# Patient Record
Sex: FEMALE | Race: WHITE | NOT HISPANIC OR LATINO | RURAL
[De-identification: names, ages, dates, MRNs, and addresses within clinical notes are randomized per-mention and may not be internally consistent; named-entity substitution may affect disease eponyms.]

---

## 2017-09-07 ENCOUNTER — OFFICE VISIT (OUTPATIENT)
Dept: ENDOCRINOLOGY | Facility: CLINIC | Age: 72
End: 2017-09-07

## 2017-09-07 VITALS
BODY MASS INDEX: 26.06 KG/M2 | WEIGHT: 138 LBS | DIASTOLIC BLOOD PRESSURE: 72 MMHG | SYSTOLIC BLOOD PRESSURE: 122 MMHG | HEIGHT: 61 IN | HEART RATE: 83 BPM

## 2017-09-07 DIAGNOSIS — M81.0 OSTEOPOROSIS: ICD-10-CM

## 2017-09-07 DIAGNOSIS — I10 ESSENTIAL HYPERTENSION: ICD-10-CM

## 2017-09-07 DIAGNOSIS — E55.9 VITAMIN D DEFICIENCY: ICD-10-CM

## 2017-09-07 DIAGNOSIS — E06.3 HASHIMOTO'S THYROIDITIS: Primary | ICD-10-CM

## 2017-09-07 DIAGNOSIS — R73.01 IMPAIRED FASTING GLUCOSE: ICD-10-CM

## 2017-09-07 PROCEDURE — 85025 COMPLETE CBC W/AUTO DIFF WBC: CPT | Performed by: INTERNAL MEDICINE

## 2017-09-07 PROCEDURE — 80053 COMPREHEN METABOLIC PANEL: CPT | Performed by: INTERNAL MEDICINE

## 2017-09-07 PROCEDURE — 99214 OFFICE O/P EST MOD 30 MIN: CPT | Performed by: INTERNAL MEDICINE

## 2017-09-07 PROCEDURE — 83036 HEMOGLOBIN GLYCOSYLATED A1C: CPT | Performed by: INTERNAL MEDICINE

## 2017-09-07 PROCEDURE — 82607 VITAMIN B-12: CPT | Performed by: INTERNAL MEDICINE

## 2017-09-07 PROCEDURE — 84443 ASSAY THYROID STIM HORMONE: CPT | Performed by: INTERNAL MEDICINE

## 2017-09-07 PROCEDURE — 82306 VITAMIN D 25 HYDROXY: CPT | Performed by: INTERNAL MEDICINE

## 2017-09-07 PROCEDURE — 36415 COLL VENOUS BLD VENIPUNCTURE: CPT | Performed by: FAMILY MEDICINE

## 2017-09-07 PROCEDURE — 80061 LIPID PANEL: CPT | Performed by: INTERNAL MEDICINE

## 2017-09-07 RX ORDER — LISINOPRIL AND HYDROCHLOROTHIAZIDE 20; 12.5 MG/1; MG/1
1 TABLET ORAL DAILY
Qty: 90 TABLET | Refills: 3 | Status: SHIPPED | OUTPATIENT
Start: 2017-09-07

## 2017-09-07 RX ORDER — ERGOCALCIFEROL 1.25 MG/1
50000 CAPSULE ORAL
Qty: 6 CAPSULE | Refills: 3 | Status: SHIPPED | OUTPATIENT
Start: 2017-09-07 | End: 2017-09-07 | Stop reason: SDUPTHER

## 2017-09-07 RX ORDER — LEVOTHYROXINE AND LIOTHYRONINE 19; 4.5 UG/1; UG/1
30 TABLET ORAL DAILY
Qty: 90 TABLET | Refills: 3 | Status: SHIPPED | OUTPATIENT
Start: 2017-09-07

## 2017-09-07 RX ORDER — NICOTINE POLACRILEX 4 MG/1
1 GUM, CHEWING ORAL 2 TIMES DAILY
Qty: 180 EACH | Refills: 3 | Status: SHIPPED | OUTPATIENT
Start: 2017-09-07 | End: 2018-09-27 | Stop reason: SDUPTHER

## 2017-09-07 RX ORDER — AMLODIPINE BESYLATE 2.5 MG/1
2.5 TABLET ORAL DAILY
Qty: 90 TABLET | Refills: 3 | Status: SHIPPED | OUTPATIENT
Start: 2017-09-07 | End: 2018-09-07

## 2017-09-07 RX ORDER — ERGOCALCIFEROL 1.25 MG/1
50000 CAPSULE ORAL
Qty: 6 CAPSULE | Refills: 3 | Status: SHIPPED | OUTPATIENT
Start: 2017-09-07

## 2017-09-07 RX ORDER — ERGOCALCIFEROL 1.25 MG/1
50000 CAPSULE ORAL
Qty: 2 CAPSULE | Refills: 11 | Status: SHIPPED | OUTPATIENT
Start: 2017-09-07 | End: 2017-09-07 | Stop reason: SDUPTHER

## 2017-09-07 NOTE — PROGRESS NOTES
Suzy Linder is a 72 y.o. female  Comes for hypothryoidism    Referred bySammy العراقي MD      70 yo  comes for consultation    reason - hypothyroidism    comorbidities - Hep C aquired through transfusions    duration - since 1967    symptoms - fatigue , wake up tired , cold intolerance     alleviating factors -  On armour thyroid and better.  On synthroid felt weak     severity - moderate    timing - constant    records from Dr. العراقي from 5-14 obtained and reviewed    --    I also follow her for osteoporosis    was on calcitionin, prolia not approved  I gave her reclast and developed side effects , see AP     --     Now told to have prediabetes       Past Medical History:   Diagnosis Date   • Acute sinusitis    • Chronic hepatitis C    • Hashimoto's thyroiditis    • Hypertension    • Need for immunization against influenza    • Osteoporosis    • Posterior rhinorrhea    • Vitamin D deficiency      Family History   Problem Relation Age of Onset   • Other Other      THYROID DISORDER     Social History   Substance Use Topics   • Smoking status: Never Smoker   • Smokeless tobacco: Never Used   • Alcohol use None       Review of Systems    Review of Systems   Constitutional: Negative for activity change, appetite change, chills, diaphoresis, fatigue, fever and unexpected weight change.   HENT: Negative for congestion, drooling, ear discharge, ear pain, facial swelling, mouth sores, nosebleeds, postnasal drip, sinus pressure, sneezing, sore throat, tinnitus, trouble swallowing and voice change.    Eyes: Negative.  Negative for photophobia, pain, discharge, redness and itching.   Respiratory: Negative.  Negative for apnea, cough, choking, chest tightness, shortness of breath, wheezing and stridor.    Cardiovascular: Negative.  Negative for chest pain, palpitations and leg swelling.   Gastrointestinal: Negative.  Negative for abdominal distention, abdominal pain, constipation, diarrhea, nausea and vomiting.  "  Endocrine: Negative.  Negative for cold intolerance, heat intolerance, polydipsia, polyphagia and polyuria.   Genitourinary: Negative for difficulty urinating, dysuria, flank pain and frequency.   Musculoskeletal: Negative for arthralgias, back pain, gait problem, joint swelling, myalgias, neck pain and neck stiffness.   Skin: Negative for color change, pallor, rash and wound.   Allergic/Immunologic: Negative for environmental allergies, food allergies and immunocompromised state.   Neurological: Negative for dizziness, tremors, seizures, syncope, facial asymmetry, speech difficulty, weakness, light-headedness, numbness and headaches.   Hematological: Negative for adenopathy. Does not bruise/bleed easily.   Psychiatric/Behavioral: Negative for agitation, behavioral problems, confusion, decreased concentration, dysphoric mood, hallucinations, self-injury, sleep disturbance and suicidal ideas. The patient is not nervous/anxious and is not hyperactive.         Objective:     /72 (BP Location: Right arm, Patient Position: Sitting, Cuff Size: Adult)  Pulse 83  Ht 61\" (154.9 cm)  Wt 138 lb (62.6 kg)  BMI 26.07 kg/m2    Physical Exam   Constitutional: She is oriented to person, place, and time. She appears well-developed.   HENT:   Head: Normocephalic.   Right Ear: External ear normal.   Left Ear: External ear normal.   Nose: Nose normal.   Eyes: Conjunctivae and EOM are normal. No scleral icterus.   Neck: Normal range of motion. Neck supple. No tracheal deviation present. No thyromegaly present.   Cardiovascular: Normal rate, regular rhythm, normal heart sounds and intact distal pulses.  Exam reveals no gallop and no friction rub.    No murmur heard.  Pulmonary/Chest: Effort normal and breath sounds normal. No stridor. No respiratory distress. She has no wheezes. She has no rales. She exhibits no tenderness.   Abdominal: Soft. Bowel sounds are normal. She exhibits no distension and no mass. There is no " tenderness. There is no rebound and no guarding.   Musculoskeletal: Normal range of motion. She exhibits no tenderness or deformity.   Lymphadenopathy:     She has no cervical adenopathy.   Neurological: She is alert and oriented to person, place, and time. She displays normal reflexes. She exhibits normal muscle tone. Coordination normal.   Skin: No rash noted. No erythema. No pallor.   Psychiatric: She has a normal mood and affect. Her behavior is normal. Judgment and thought content normal.       Lab Review    Lab Results   Component Value Date    HGBA1C 5.8 (H) 09/07/2017       Results for orders placed or performed in visit on 09/07/17   CBC Auto Differential   Result Value Ref Range    WBC 7.74 3.20 - 9.80 10*3/mm3    RBC 5.44 (H) 3.77 - 5.16 10*6/mm3    Hemoglobin 14.5 12.0 - 15.5 g/dL    Hematocrit 44.3 35.0 - 45.0 %    MCV 81.4 80.0 - 98.0 fL    MCH 26.7 26.5 - 34.0 pg    MCHC 32.7 31.4 - 36.0 g/dL    RDW 13.0 11.5 - 14.5 %    RDW-SD 38.8 36.4 - 46.3 fl    MPV 11.8 8.0 - 12.0 fL    Platelets 217 150 - 450 10*3/mm3    Neutrophil % 58.3 37.0 - 80.0 %    Lymphocyte % 31.4 10.0 - 50.0 %    Monocyte % 8.3 0.0 - 12.0 %    Eosinophil % 1.6 0.0 - 7.0 %    Basophil % 0.3 0.0 - 2.0 %    Immature Grans % 0.1 0.0 - 0.5 %    Neutrophils, Absolute 4.52 2.00 - 8.60 10*3/mm3    Lymphocytes, Absolute 2.43 0.60 - 4.20 10*3/mm3    Monocytes, Absolute 0.64 0.00 - 0.90 10*3/mm3    Eosinophils, Absolute 0.12 0.00 - 0.70 10*3/mm3    Basophils, Absolute 0.02 0.00 - 0.20 10*3/mm3    Immature Grans, Absolute 0.01 0.00 - 0.02 10*3/mm3   Comprehensive Metabolic Panel   Result Value Ref Range    Glucose 88 60 - 100 mg/dL    BUN 17 7 - 21 mg/dL    Creatinine 0.87 0.50 - 1.00 mg/dL    Sodium 138 137 - 145 mmol/L    Potassium 4.8 3.5 - 5.1 mmol/L    Chloride 100 95 - 110 mmol/L    CO2 29.0 22.0 - 31.0 mmol/L    Calcium 9.5 8.4 - 10.2 mg/dL    Total Protein 7.7 6.3 - 8.6 g/dL    Albumin 4.60 3.40 - 4.80 g/dL    ALT (SGPT) 27 9 - 52 U/L     AST (SGOT) 17 14 - 36 U/L    Alkaline Phosphatase 62 38 - 126 U/L    Total Bilirubin 1.0 0.2 - 1.3 mg/dL    eGFR Non African Amer 64 39 - 90 mL/min/1.73    Globulin 3.1 2.3 - 3.5 gm/dL    A/G Ratio 1.5 1.1 - 1.8 g/dL    BUN/Creatinine Ratio 19.5 7.0 - 25.0    Anion Gap 9.0 5.0 - 15.0 mmol/L   Hemoglobin A1c   Result Value Ref Range    Hemoglobin A1C 5.8 (H) 4 - 5.6 %   Lipid Panel   Result Value Ref Range    Total Cholesterol 192 0 - 199 mg/dL    Triglycerides 96 20 - 199 mg/dL    HDL Cholesterol 50 (L) 60 - 200 mg/dL    LDL Cholesterol  134 (H) 1 - 129 mg/dL    LDL/HDL Ratio 2.46 0.00 - 3.22   TSH   Result Value Ref Range    TSH 0.440 (L) 0.460 - 4.680 mIU/mL   Vitamin B12   Result Value Ref Range    Vitamin B-12 494 239 - 931 pg/mL   Vitamin D 25 Hydroxy   Result Value Ref Range    25 Hydroxy, Vitamin D 35.0 30.0 - 100.0 ng/ml       Lab Results   Component Value Date    TSH 0.440 (L) 09/07/2017         Assessment/Plan       ICD-10-CM ICD-9-CM   1. Hashimoto's thyroiditis E06.3 245.2   2. Osteoporosis M81.0 733.00   3. Vitamin D deficiency E55.9 268.9   4. Essential hypertension I10 401.9   5. Impaired fasting glucose R73.01 790.21           Hypothyroidism    Failed levothyroxine    Now on  armour thryoid 30 mg daily     Lab Results   Component Value Date    TSH 0.440 (L) 09/07/2017    TSH 0.47 08/15/2016    TSH 0.43 (L) 02/29/2016       Lab Results   Component Value Date    FREET4 0.83 08/15/2016    FREET4 1.04 04/22/2015       Lab Results   Component Value Date    X2RCZUX 176 (H) 08/15/2016       Mild suppression, ok to keep    =====================    Vit D Def- osteoporosis    June 2015 - Scan shows  L1-L4 (BMD)- 0.874 , T score is -1.6   Right femoral neck - 0.589  T score is -2.3.   Left femoral neck- 0.638 g. T score is -1.9     Off calcium and vitamin D     has been on calcitonin in the past     reclast side effects    prolia not covered?  Insurance has to approve    Aim for 1000 mg of calcium    Was on vit  D 50 th u weekly   Restart at every 2 weeks       Lab Results   Component Value Date    PKUT86ZE 35.0 09/07/2017    CTXO66TQ 52.8 10/04/2016    TGQW04BH 61.9 08/15/2016           Prediabetes    Lab Results   Component Value Date    HGBA1C 5.8 (H) 09/07/2017    HGBA1C 5.3 11/03/2015     Lab Results   Component Value Date    LDLCALC 119 02/29/2016    CREATININE 0.87 09/07/2017       Observe, no metformin yet      bp controlled on amlodipine and lisinopril , hctz          MDM

## 2017-09-08 LAB
25(OH)D3 SERPL-MCNC: 35 NG/ML (ref 30–100)
ALBUMIN SERPL-MCNC: 4.6 G/DL (ref 3.4–4.8)
ALBUMIN/GLOB SERPL: 1.5 G/DL (ref 1.1–1.8)
ALP SERPL-CCNC: 62 U/L (ref 38–126)
ALT SERPL W P-5'-P-CCNC: 27 U/L (ref 9–52)
ANION GAP SERPL CALCULATED.3IONS-SCNC: 9 MMOL/L (ref 5–15)
ARTICHOKE IGE QN: 134 MG/DL (ref 1–129)
AST SERPL-CCNC: 17 U/L (ref 14–36)
BASOPHILS # BLD AUTO: 0.02 10*3/MM3 (ref 0–0.2)
BASOPHILS NFR BLD AUTO: 0.3 % (ref 0–2)
BILIRUB SERPL-MCNC: 1 MG/DL (ref 0.2–1.3)
BUN BLD-MCNC: 17 MG/DL (ref 7–21)
BUN/CREAT SERPL: 19.5 (ref 7–25)
CALCIUM SPEC-SCNC: 9.5 MG/DL (ref 8.4–10.2)
CHLORIDE SERPL-SCNC: 100 MMOL/L (ref 95–110)
CHOLEST SERPL-MCNC: 192 MG/DL (ref 0–199)
CO2 SERPL-SCNC: 29 MMOL/L (ref 22–31)
CREAT BLD-MCNC: 0.87 MG/DL (ref 0.5–1)
DEPRECATED RDW RBC AUTO: 38.8 FL (ref 36.4–46.3)
EOSINOPHIL # BLD AUTO: 0.12 10*3/MM3 (ref 0–0.7)
EOSINOPHIL NFR BLD AUTO: 1.6 % (ref 0–7)
ERYTHROCYTE [DISTWIDTH] IN BLOOD BY AUTOMATED COUNT: 13 % (ref 11.5–14.5)
GFR SERPL CREATININE-BSD FRML MDRD: 64 ML/MIN/1.73 (ref 39–90)
GLOBULIN UR ELPH-MCNC: 3.1 GM/DL (ref 2.3–3.5)
GLUCOSE BLD-MCNC: 88 MG/DL (ref 60–100)
HBA1C MFR BLD: 5.8 % (ref 4–5.6)
HCT VFR BLD AUTO: 44.3 % (ref 35–45)
HDLC SERPL-MCNC: 50 MG/DL (ref 60–200)
HGB BLD-MCNC: 14.5 G/DL (ref 12–15.5)
IMM GRANULOCYTES # BLD: 0.01 10*3/MM3 (ref 0–0.02)
IMM GRANULOCYTES NFR BLD: 0.1 % (ref 0–0.5)
LDLC/HDLC SERPL: 2.46 {RATIO} (ref 0–3.22)
LYMPHOCYTES # BLD AUTO: 2.43 10*3/MM3 (ref 0.6–4.2)
LYMPHOCYTES NFR BLD AUTO: 31.4 % (ref 10–50)
MCH RBC QN AUTO: 26.7 PG (ref 26.5–34)
MCHC RBC AUTO-ENTMCNC: 32.7 G/DL (ref 31.4–36)
MCV RBC AUTO: 81.4 FL (ref 80–98)
MONOCYTES # BLD AUTO: 0.64 10*3/MM3 (ref 0–0.9)
MONOCYTES NFR BLD AUTO: 8.3 % (ref 0–12)
NEUTROPHILS # BLD AUTO: 4.52 10*3/MM3 (ref 2–8.6)
NEUTROPHILS NFR BLD AUTO: 58.3 % (ref 37–80)
PLATELET # BLD AUTO: 217 10*3/MM3 (ref 150–450)
PMV BLD AUTO: 11.8 FL (ref 8–12)
POTASSIUM BLD-SCNC: 4.8 MMOL/L (ref 3.5–5.1)
PROT SERPL-MCNC: 7.7 G/DL (ref 6.3–8.6)
RBC # BLD AUTO: 5.44 10*6/MM3 (ref 3.77–5.16)
SODIUM BLD-SCNC: 138 MMOL/L (ref 137–145)
TRIGL SERPL-MCNC: 96 MG/DL (ref 20–199)
TSH SERPL DL<=0.05 MIU/L-ACNC: 0.44 MIU/ML (ref 0.46–4.68)
VIT B12 BLD-MCNC: 494 PG/ML (ref 239–931)
WBC NRBC COR # BLD: 7.74 10*3/MM3 (ref 3.2–9.8)

## 2017-09-09 PROBLEM — R73.01 IMPAIRED FASTING GLUCOSE: Status: ACTIVE | Noted: 2017-09-09

## 2017-09-09 RX ORDER — PRAVASTATIN SODIUM 20 MG
20 TABLET ORAL EVERY EVENING
Qty: 30 TABLET | Refills: 11 | Status: SHIPPED | OUTPATIENT
Start: 2017-09-09 | End: 2018-09-09

## 2018-09-27 RX ORDER — OMEPRAZOLE 20 MG/1
CAPSULE, DELAYED RELEASE ORAL
Qty: 180 CAPSULE | Refills: 3 | Status: SHIPPED | OUTPATIENT
Start: 2018-09-27

## 2020-09-10 ENCOUNTER — TRANSCRIBE ORDERS (OUTPATIENT)
Dept: PHYSICAL THERAPY | Facility: CLINIC | Age: 75
End: 2020-09-10

## 2020-09-10 DIAGNOSIS — N81.89 PELVIC FLOOR WEAKNESS: Primary | ICD-10-CM
